# Patient Record
Sex: FEMALE | Race: WHITE | NOT HISPANIC OR LATINO | URBAN - METROPOLITAN AREA
[De-identification: names, ages, dates, MRNs, and addresses within clinical notes are randomized per-mention and may not be internally consistent; named-entity substitution may affect disease eponyms.]

---

## 2018-01-26 ENCOUNTER — INPATIENT (INPATIENT)
Facility: HOSPITAL | Age: 53
LOS: 1 days | Discharge: HOME | DRG: 193 | End: 2018-01-28
Attending: HOSPITALIST | Admitting: HOSPITALIST
Payer: COMMERCIAL

## 2018-01-26 VITALS
OXYGEN SATURATION: 96 % | SYSTOLIC BLOOD PRESSURE: 150 MMHG | RESPIRATION RATE: 20 BRPM | HEIGHT: 63 IN | TEMPERATURE: 98 F | DIASTOLIC BLOOD PRESSURE: 100 MMHG | HEART RATE: 101 BPM | WEIGHT: 240.08 LBS

## 2018-01-26 DIAGNOSIS — J11.00 INFLUENZA DUE TO UNIDENTIFIED INFLUENZA VIRUS WITH UNSPECIFIED TYPE OF PNEUMONIA: ICD-10-CM

## 2018-01-26 DIAGNOSIS — Z90.710 ACQUIRED ABSENCE OF BOTH CERVIX AND UTERUS: Chronic | ICD-10-CM

## 2018-01-26 LAB
ALBUMIN SERPL ELPH-MCNC: 4.1 G/DL — SIGNIFICANT CHANGE UP (ref 3.3–5.2)
ALP SERPL-CCNC: 73 U/L — SIGNIFICANT CHANGE UP (ref 40–120)
ALT FLD-CCNC: 15 U/L — SIGNIFICANT CHANGE UP
ANION GAP SERPL CALC-SCNC: 15 MMOL/L — SIGNIFICANT CHANGE UP (ref 5–17)
APPEARANCE UR: CLEAR — SIGNIFICANT CHANGE UP
AST SERPL-CCNC: 31 U/L — SIGNIFICANT CHANGE UP
BACTERIA # UR AUTO: ABNORMAL
BILIRUB SERPL-MCNC: 0.3 MG/DL — LOW (ref 0.4–2)
BILIRUB UR-MCNC: NEGATIVE — SIGNIFICANT CHANGE UP
BUN SERPL-MCNC: 9 MG/DL — SIGNIFICANT CHANGE UP (ref 8–20)
CALCIUM SERPL-MCNC: 9 MG/DL — SIGNIFICANT CHANGE UP (ref 8.6–10.2)
CHLORIDE SERPL-SCNC: 99 MMOL/L — SIGNIFICANT CHANGE UP (ref 98–107)
CO2 SERPL-SCNC: 25 MMOL/L — SIGNIFICANT CHANGE UP (ref 22–29)
COLOR SPEC: YELLOW — SIGNIFICANT CHANGE UP
CREAT SERPL-MCNC: 0.67 MG/DL — SIGNIFICANT CHANGE UP (ref 0.5–1.3)
DIFF PNL FLD: ABNORMAL
EOSINOPHIL # BLD AUTO: 0 K/UL — SIGNIFICANT CHANGE UP (ref 0–0.5)
EOSINOPHIL NFR BLD AUTO: 0.1 % — SIGNIFICANT CHANGE UP (ref 0–6)
EPI CELLS # UR: SIGNIFICANT CHANGE UP
FLUAV H1 2009 PAND RNA SPEC QL NAA+PROBE: DETECTED
GLUCOSE SERPL-MCNC: 105 MG/DL — SIGNIFICANT CHANGE UP (ref 70–115)
GLUCOSE UR QL: NEGATIVE MG/DL — SIGNIFICANT CHANGE UP
HCT VFR BLD CALC: 36.9 % — LOW (ref 37–47)
HGB BLD-MCNC: 12.3 G/DL — SIGNIFICANT CHANGE UP (ref 12–16)
KETONES UR-MCNC: NEGATIVE — SIGNIFICANT CHANGE UP
LACTATE BLDV-MCNC: 1.1 MMOL/L — SIGNIFICANT CHANGE UP (ref 0.5–2)
LEUKOCYTE ESTERASE UR-ACNC: NEGATIVE — SIGNIFICANT CHANGE UP
LYMPHOCYTES # BLD AUTO: 2.2 K/UL — SIGNIFICANT CHANGE UP (ref 1–4.8)
LYMPHOCYTES # BLD AUTO: 21.5 % — SIGNIFICANT CHANGE UP (ref 20–55)
MCHC RBC-ENTMCNC: 29.4 PG — SIGNIFICANT CHANGE UP (ref 27–31)
MCHC RBC-ENTMCNC: 33.3 G/DL — SIGNIFICANT CHANGE UP (ref 32–36)
MCV RBC AUTO: 88.3 FL — SIGNIFICANT CHANGE UP (ref 81–99)
MONOCYTES # BLD AUTO: 0.8 K/UL — SIGNIFICANT CHANGE UP (ref 0–0.8)
MONOCYTES NFR BLD AUTO: 7.8 % — SIGNIFICANT CHANGE UP (ref 3–10)
NEUTROPHILS # BLD AUTO: 7.1 K/UL — SIGNIFICANT CHANGE UP (ref 1.8–8)
NEUTROPHILS NFR BLD AUTO: 70.3 % — SIGNIFICANT CHANGE UP (ref 37–73)
NITRITE UR-MCNC: NEGATIVE — SIGNIFICANT CHANGE UP
PH UR: 6 — SIGNIFICANT CHANGE UP (ref 5–8)
PLATELET # BLD AUTO: 242 K/UL — SIGNIFICANT CHANGE UP (ref 150–400)
POTASSIUM SERPL-MCNC: 4 MMOL/L — SIGNIFICANT CHANGE UP (ref 3.5–5.3)
POTASSIUM SERPL-SCNC: 4 MMOL/L — SIGNIFICANT CHANGE UP (ref 3.5–5.3)
PROT SERPL-MCNC: 8 G/DL — SIGNIFICANT CHANGE UP (ref 6.6–8.7)
PROT UR-MCNC: NEGATIVE MG/DL — SIGNIFICANT CHANGE UP
RAPID RVP RESULT: DETECTED
RBC # BLD: 4.18 M/UL — LOW (ref 4.4–5.2)
RBC # FLD: 12.8 % — SIGNIFICANT CHANGE UP (ref 11–15.6)
RBC CASTS # UR COMP ASSIST: SIGNIFICANT CHANGE UP /HPF (ref 0–4)
SODIUM SERPL-SCNC: 139 MMOL/L — SIGNIFICANT CHANGE UP (ref 135–145)
SP GR SPEC: 1.01 — SIGNIFICANT CHANGE UP (ref 1.01–1.02)
UROBILINOGEN FLD QL: NEGATIVE MG/DL — SIGNIFICANT CHANGE UP
WBC # BLD: 10.1 K/UL — SIGNIFICANT CHANGE UP (ref 4.8–10.8)
WBC # FLD AUTO: 10.1 K/UL — SIGNIFICANT CHANGE UP (ref 4.8–10.8)
WBC UR QL: SIGNIFICANT CHANGE UP

## 2018-01-26 PROCEDURE — 99285 EMERGENCY DEPT VISIT HI MDM: CPT

## 2018-01-26 PROCEDURE — 71250 CT THORAX DX C-: CPT | Mod: 26

## 2018-01-26 PROCEDURE — 99223 1ST HOSP IP/OBS HIGH 75: CPT

## 2018-01-26 PROCEDURE — 71045 X-RAY EXAM CHEST 1 VIEW: CPT | Mod: 26

## 2018-01-26 RX ORDER — SACCHAROMYCES BOULARDII 250 MG
250 POWDER IN PACKET (EA) ORAL
Qty: 0 | Refills: 0 | Status: DISCONTINUED | OUTPATIENT
Start: 2018-01-26 | End: 2018-01-28

## 2018-01-26 RX ORDER — IPRATROPIUM/ALBUTEROL SULFATE 18-103MCG
3 AEROSOL WITH ADAPTER (GRAM) INHALATION EVERY 6 HOURS
Qty: 0 | Refills: 0 | Status: DISCONTINUED | OUTPATIENT
Start: 2018-01-26 | End: 2018-01-28

## 2018-01-26 RX ORDER — IPRATROPIUM/ALBUTEROL SULFATE 18-103MCG
3 AEROSOL WITH ADAPTER (GRAM) INHALATION ONCE
Qty: 0 | Refills: 0 | Status: COMPLETED | OUTPATIENT
Start: 2018-01-26 | End: 2018-01-26

## 2018-01-26 RX ORDER — SODIUM CHLORIDE 9 MG/ML
1000 INJECTION INTRAMUSCULAR; INTRAVENOUS; SUBCUTANEOUS ONCE
Qty: 0 | Refills: 0 | Status: COMPLETED | OUTPATIENT
Start: 2018-01-26 | End: 2018-01-26

## 2018-01-26 RX ORDER — CEFTRIAXONE 500 MG/1
1 INJECTION, POWDER, FOR SOLUTION INTRAMUSCULAR; INTRAVENOUS ONCE
Qty: 0 | Refills: 0 | Status: DISCONTINUED | OUTPATIENT
Start: 2018-01-26 | End: 2018-01-26

## 2018-01-26 RX ORDER — AZITHROMYCIN 500 MG/1
500 TABLET, FILM COATED ORAL ONCE
Qty: 0 | Refills: 0 | Status: COMPLETED | OUTPATIENT
Start: 2018-01-26 | End: 2018-01-26

## 2018-01-26 RX ORDER — PANTOPRAZOLE SODIUM 20 MG/1
40 TABLET, DELAYED RELEASE ORAL
Qty: 0 | Refills: 0 | Status: DISCONTINUED | OUTPATIENT
Start: 2018-01-26 | End: 2018-01-28

## 2018-01-26 RX ORDER — VANCOMYCIN HCL 1 G
1000 VIAL (EA) INTRAVENOUS ONCE
Qty: 0 | Refills: 0 | Status: COMPLETED | OUTPATIENT
Start: 2018-01-26 | End: 2018-01-26

## 2018-01-26 RX ADMIN — SODIUM CHLORIDE 2000 MILLILITER(S): 9 INJECTION INTRAMUSCULAR; INTRAVENOUS; SUBCUTANEOUS at 13:31

## 2018-01-26 RX ADMIN — Medication 3 MILLILITER(S): at 21:12

## 2018-01-26 RX ADMIN — Medication 250 MILLIGRAM(S): at 15:00

## 2018-01-26 RX ADMIN — Medication 75 MILLIGRAM(S): at 13:24

## 2018-01-26 RX ADMIN — Medication 3 MILLILITER(S): at 15:16

## 2018-01-26 RX ADMIN — AZITHROMYCIN 255 MILLIGRAM(S): 500 TABLET, FILM COATED ORAL at 13:24

## 2018-01-26 RX ADMIN — Medication 250 MILLIGRAM(S): at 18:28

## 2018-01-26 RX ADMIN — Medication 40 MILLIGRAM(S): at 13:24

## 2018-01-26 RX ADMIN — Medication 3 MILLILITER(S): at 13:25

## 2018-01-26 NOTE — ED ADULT NURSE NOTE - OBJECTIVE STATEMENT
pt alert and awake x3, arrived to ED from [facility] with flu like symptoms and diff breathing that started yesterday, pt was diagnosed with flu and was prescribed tamiflu, states she went back to [facility] and was sent for IV abx, LS wheezes and rales.

## 2018-01-26 NOTE — H&P ADULT - NSHPPHYSICALEXAM_GEN_ALL_CORE
PHYSICAL EXAM:    Vital Signs Last 24 Hrs  T(C): 36.7 (26 Jan 2018 16:33), Max: 36.9 (26 Jan 2018 10:26)  T(F): 98 (26 Jan 2018 16:33), Max: 98.5 (26 Jan 2018 10:26)  HR: 88 (26 Jan 2018 13:03) (88 - 101)  BP: 144/74 (26 Jan 2018 16:33) (144/74 - 172/80)  BP(mean): --  RR: 20 (26 Jan 2018 16:33) (20 - 20)  SpO2: 98% (26 Jan 2018 16:33) (96% - 98%)    GENERAL: Pt lying comfortably, NAD.  ENMT: PERRL, +EOMI.  NECK: soft, Supple, No JVD,   CHEST/LUNG: Minimal wheezing,   HEART: S1S2+, Regular rate and rhythm; No murmurs.  ABDOMEN: Soft, Nontender, Nondistended; Bowel sounds present.  MUSCULOSKELETAL: Normal range of motion.  SKIN: No rashes or lesions.  NEURO: AAOX3, no focal deficits, no motor r sensory loss.  PSYCH: normal mood. PHYSICAL EXAM:    Vital Signs Last 24 Hrs  T(C): 36.7 (26 Jan 2018 16:33), Max: 36.9 (26 Jan 2018 10:26)  T(F): 98 (26 Jan 2018 16:33), Max: 98.5 (26 Jan 2018 10:26)  HR: 88 (26 Jan 2018 13:03) (88 - 101)  BP: 144/74 (26 Jan 2018 16:33) (144/74 - 172/80)  BP(mean): --  RR: 20 (26 Jan 2018 16:33) (20 - 20)  SpO2: 98% (26 Jan 2018 16:33) (96% - 98%)    GENERAL: Pt lying comfortably, NAD.  ENMT: PERRL, +EOMI.  NECK: soft, Supple, No JVD,   CHEST/LUNG: CTAB, no wheezing,   HEART: S1S2+, Regular rate and rhythm; No murmurs.  ABDOMEN: Soft, Nontender, Nondistended; Bowel sounds present.  MUSCULOSKELETAL: Normal range of motion.  SKIN: No rashes or lesions.  NEURO: AAOX3, no focal deficits, no motor r sensory loss.  PSYCH: normal mood.

## 2018-01-26 NOTE — H&P ADULT - ASSESSMENT
51 yo female with no known PMHx, not on any medication at home, presented to ED presents with fever, SOB, cough, malaise for last few days. Seen at urgent care and found to have Flu A and was given Tamiflu few days ago. Since then symptoms progressed and went to urgent care again, xray reportedly showed b/l pneumonia at urgent care and she was sent to ED for eval. In Ed patient noted to have hypoxia and CT chest showed mild bronchiectasis.    Plan:    Acute hypoxic respiratory failure due to bronchiectasis:  - S/p Zithromax /vanco/ solumedrol/ Duoneb in ED. Pt still hypoxic.  - Will keep on , supplement oxygen, incentive spirometry, Duoneb standing for today, Robitussin prn and also start Levaquin.  - F/u blood cx, Sputum cx ordered.     DVT ppx:  - Lovenox 51 yo female with no known PMHx, not on any medication at home, presented to ED presents with fever, SOB, cough, malaise for last few days. Seen at urgent care and found to have Flu A and was given Tamiflu few days ago. Since then symptoms progressed and went to urgent care again, xray reportedly showed b/l pneumonia at urgent care and she was sent to ED for eval. In Ed patient noted to have hypoxia and CT chest showed mild bronchiectasis.    Plan:    Acute hypoxic respiratory failure due to bronchiectasis:  - S/p Zithromax /vanco/ solumedrol/ Duoneb in ED. Pt still hypoxic.  - Will keep on , supplement oxygen, incentive spirometry, Duoneb standing for today, Robitussin prn and also start Levaquin.  - F/u blood cx, Sputum cx ordered. Check RVP, UA.     DVT ppx:  - Lovenox 53 yo female with no known PMHx, not on any medication at home, presented to ED presents with fever, SOB, cough, malaise for last few days. Seen at urgent care on Wednesday and found to have Flu A and was given Tamiflu. Since then symptoms progressed and went to urgent care again earlier today, xray reportedly showed b/l pneumonia at urgent care and she was sent to ED for eval. In ED patient noted to have hypoxia and CT chest showed mild bronchiectasis.    Plan:    Acute hypoxic respiratory failure due to bronchiectasis:  - S/p Zithromax /vanco/ solumedrol/ Duoneb in ED. Pt still hypoxic. No wheezing on exam.  - Will keep on , supplement oxygen, incentive spirometry, Duoneb standing for today, Robitussin prn and also start Levaquin.  - F/u blood cx, Sputum cx ordered. Check RVP, UA.     influenza:  - On tamilfu at home D-3.  - RVP ordered.  - C/w Tamiflu to complete 5 day course.     DVT ppx:  - Lovenox

## 2018-01-26 NOTE — H&P ADULT - HISTORY OF PRESENT ILLNESS
51 yo female with no known PMHx, not on any medication at home, presented to ED presents with SOB for last few days. Patient states she was recently on a cruise to [facility]- returned, and developed low grade fever for several days associated with malaise and generalized achiness- she was seen at urgent care and found to have Flu A and was given Tamiflu. Since then symptoms progressed- developed SOB with wheezing and coughing and went to urgent care again, xray reportedly showed b/l pneumonia at urgent care and she was sent to ED for eval. Pt denied other complaints no vomiting, nausea, chest pain, bowel/bladder habits are normal.

## 2018-01-26 NOTE — H&P ADULT - NSHPLABSRESULTS_GEN_ALL_CORE
LABS:                        12.3   10.1  )-----------( 242      ( 2018 13:11 )             36.9         139  |  99  |  9.0  ----------------------------<  105  4.0   |  25.0  |  0.67    Ca    9.0      2018 13:11    TPro  8.0  /  Alb  4.1  /  TBili  0.3<L>  /  DBili  x   /  AST  31  /  ALT  15  /  AlkPhos  73          LIVER FUNCTIONS - ( 2018 13:11 )  Alb: 4.1 g/dL / Pro: 8.0 g/dL / ALK PHOS: 73 U/L / ALT: 15 U/L / AST: 31 U/L / GGT: x           Urinalysis Basic - ( 2018 16:49 )    Color: Yellow / Appearance: Clear / S.010 / pH: x  Gluc: x / Ketone: Negative  / Bili: Negative / Urobili: Negative mg/dL   Blood: x / Protein: Negative mg/dL / Nitrite: Negative   Leuk Esterase: Negative / RBC: x / WBC x   Sq Epi: x / Non Sq Epi: x / Bacteria: x

## 2018-01-26 NOTE — ED PROVIDER NOTE - OBJECTIVE STATEMENT
53 yo female presents with fever, cough, and SOB since wednesday; patient states she was recently on a cruise to [facility], returned, and developed fever several days later; achy; seen at urgent care, started on tamiflu; had +confirmed flu A; symptoms have progressed, SOB, now wheezing; no hx of asthma, no tobacco; seen at urgent care again, xray reportedly showed b/l pneumonia; sent to ED for eval

## 2018-01-26 NOTE — ED ADULT TRIAGE NOTE - CHIEF COMPLAINT QUOTE
had a cold went to [facility] today was told she has bilateral pneumonia, now c/o diff. breathing

## 2018-01-26 NOTE — ED PROVIDER NOTE - PROGRESS NOTE DETAILS
patient reassessed, still with dyspnea, worse with ambulating to bathroom, rhonchorous, wheezing, room air sat 90%; better on O2, will admit

## 2018-01-26 NOTE — ED ADULT NURSE NOTE - CHIEF COMPLAINT QUOTE
had a cold went to [facility] today was told she has bilateral pneumonia, now c/o diff. breathing

## 2018-01-27 DIAGNOSIS — J09.X1 INFLUENZA DUE TO IDENTIFIED NOVEL INFLUENZA A VIRUS WITH PNEUMONIA: ICD-10-CM

## 2018-01-27 DIAGNOSIS — Z29.9 ENCOUNTER FOR PROPHYLACTIC MEASURES, UNSPECIFIED: ICD-10-CM

## 2018-01-27 DIAGNOSIS — J47.0 BRONCHIECTASIS WITH ACUTE LOWER RESPIRATORY INFECTION: ICD-10-CM

## 2018-01-27 LAB
CULTURE RESULTS: NO GROWTH — SIGNIFICANT CHANGE UP
HCT VFR BLD CALC: 34.7 % — LOW (ref 37–47)
HGB BLD-MCNC: 11.3 G/DL — LOW (ref 12–16)
MCHC RBC-ENTMCNC: 29 PG — SIGNIFICANT CHANGE UP (ref 27–31)
MCHC RBC-ENTMCNC: 32.6 G/DL — SIGNIFICANT CHANGE UP (ref 32–36)
MCV RBC AUTO: 89 FL — SIGNIFICANT CHANGE UP (ref 81–99)
PLATELET # BLD AUTO: 283 K/UL — SIGNIFICANT CHANGE UP (ref 150–400)
RBC # BLD: 3.9 M/UL — LOW (ref 4.4–5.2)
RBC # FLD: 12.4 % — SIGNIFICANT CHANGE UP (ref 11–15.6)
SPECIMEN SOURCE: SIGNIFICANT CHANGE UP
WBC # BLD: 8.1 K/UL — SIGNIFICANT CHANGE UP (ref 4.8–10.8)
WBC # FLD AUTO: 8.1 K/UL — SIGNIFICANT CHANGE UP (ref 4.8–10.8)

## 2018-01-27 PROCEDURE — 99233 SBSQ HOSP IP/OBS HIGH 50: CPT

## 2018-01-27 RX ORDER — INFLUENZA VIRUS VACCINE 15; 15; 15; 15 UG/.5ML; UG/.5ML; UG/.5ML; UG/.5ML
0.5 SUSPENSION INTRAMUSCULAR ONCE
Qty: 0 | Refills: 0 | Status: COMPLETED | OUTPATIENT
Start: 2018-01-27 | End: 2018-01-27

## 2018-01-27 RX ADMIN — Medication 3 MILLILITER(S): at 09:15

## 2018-01-27 RX ADMIN — Medication 75 MILLIGRAM(S): at 06:47

## 2018-01-27 RX ADMIN — Medication 250 MILLIGRAM(S): at 06:47

## 2018-01-27 RX ADMIN — PANTOPRAZOLE SODIUM 40 MILLIGRAM(S): 20 TABLET, DELAYED RELEASE ORAL at 06:48

## 2018-01-27 RX ADMIN — Medication 75 MILLIGRAM(S): at 18:12

## 2018-01-27 RX ADMIN — Medication 3 MILLILITER(S): at 21:04

## 2018-01-27 RX ADMIN — Medication 250 MILLIGRAM(S): at 18:12

## 2018-01-27 RX ADMIN — Medication 3 MILLILITER(S): at 15:38

## 2018-01-27 NOTE — ED ADULT NURSE REASSESSMENT NOTE - NS ED NURSE REASSESS COMMENT FT1
Pt is Aox3 in NAD. Pt denies complaint.  IV clean dry and intact, running without difficulty. Pt OOB independently. Safety maintained, Bed locked in lowest position. Call bell in reach. Pt awaiting bed placement.
Pt is Aox3 in NAD. Pt denies complaint.  IV clean dry and intact. Pt OOB independently. Safety maintained, Bed locked in lowest position. Call bell in reach. Pt awaiting bed placement.
pt resting comfortably denies any pain no sob at rest

## 2018-01-27 NOTE — PROGRESS NOTE ADULT - SUBJECTIVE AND OBJECTIVE BOX
HOSPITALIST PROGRESS NOTE    TRISTA HAMLIN  [id number]  52yFemale    Patient is a 52y old  Female who presents with a chief complaint of Fever, cough, SOB (2018 16:48)      SUBJECTIVE:   Chart reviewed since admission, discussed with Dr Cordero  Patient seen and examined at bedside influenza, bronchiectasis.  Feels better though still with dyspnea and cough.  Denies fever, chills      OBJECTIVE:  Vital Signs Last 24 Hrs  T(C): 37.2 (2018 07:30), Max: 37.2 (2018 07:30)  T(F): 98.9 (2018 07:30), Max: 98.9 (2018 07:30)  HR: 83 (2018 07:30) (74 - 92)  BP: 128/79 (2018 07:30) (128/79 - 176/79)  BP(mean): --  RR: 20 (2018 07:30) (18 - 23)  SpO2: 96% (2018 08:00) (94% - 98%)    PHYSICAL EXAMINATION  General: NAD[+]   nontoxic[]   ill-appearing[]  Obese[+] Sitting on edge of bed  HEENT: AT/NC[+]  PERRLA[]  EOMI[+]   Moist oral mucosa[]  Pharyngeal exudates[]  NECK: Supple[+]  JVD[] Carotid bruit[]  CVS: RRR[+]  Irregular[]  S1+S2[+]   Murmur[]  RESP: Fair air entry bilaterally[+]   Clear sounds[]  bilateral rhonchi []  wheeze[]   Crackles[]  GI: Soft[+]  Nondistended[]   Nontender[+]   Bowel Sounds[+]  Mass[]   HSM[]   Ascites[]  : suprapubic tenderness[-]   CVA Tenderness[]   Padilla[]  MS: FROM[+]   Edema[-]  CNS: AAOx3[+]    Grossly intact  INTEG: Skin is Warm[+]  dry[] Lesion[] Decubitus[]  PSYCH: Fair Mood, Affect       I&O's Summary                          11.3   8.1   )-----------( 283      ( 2018 08:59 )             34.7       01-26    139  |  99  |  9.0  ----------------------------<  105  4.0   |  25.0  |  0.67    Ca    9.0      2018 13:11    TPro  8.0  /  Alb  4.1  /  TBili  0.3<L>  /  DBili  x   /  AST  31  /  ALT  15  /  AlkPhos  73          Urinalysis Basic - ( 2018 16:49 )    Color: Yellow / Appearance: Clear / S.010 / pH: x  Gluc: x / Ketone: Negative  / Bili: Negative / Urobili: Negative mg/dL   Blood: x / Protein: Negative mg/dL / Nitrite: Negative   Leuk Esterase: Negative / RBC: 0-2 /HPF / WBC 0-2   Sq Epi: x / Non Sq Epi: Occasional / Bacteria: Occasional        Culture:    TTE:    RADIOLOGY  < from: CT Chest No Cont (18 @ 14:44) >     EXAM:  CT CHEST                          PROCEDURE DATE:  2018          INTERPRETATION:  Chest CT without contrast .  COMPARISON: None.  CLINICAL INFORMATION: Abnormal breath sounds. Assess for pneumonia..  TECHNIQUE: Contiguous axial 2.5 mmslice thickness images of the chest   were obtained without intravenous contrast administration.  FINDINGS:  There is bibasilar mild bronchiectasis with bibasilar linear and   subsegmental atelectasis. No gross endobronchial obstructing lesions   identified. No pleural effusion or pneumothorax. Upper lobes clear.   There are no mediastinal lymphadenopathy or masses.    The mediastinum great vessels are normal.     The heart is normal. There is no pericardial effusion.    Visualized upper abdominal viscera unremarkable.    The bones are normal.     IMPRESSION:    Bilateral bibasilar mild bronchiectasis with them peripheral basilar   linear subsegmental atelectasis.                DARA PORTER M.D., ATTENDING RADIOLOGIST  This document has been electronically signed. 2018  3:26PM    < end of copied text >        MEDICATIONS  (STANDING):  ALBUTerol/ipratropium for Nebulization 3 milliLiter(s) Nebulizer every 6 hours  levoFLOXacin IVPB 500 milliGRAM(s) IV Intermittent every 24 hours  levoFLOXacin IVPB      oseltamivir 75 milliGRAM(s) Oral two times a day  pantoprazole    Tablet 40 milliGRAM(s) Oral before breakfast  saccharomyces boulardii 250 milliGRAM(s) Oral two times a day      MEDICATIONS  (PRN):  guaiFENesin    Syrup 100 milliGRAM(s) Oral every 6 hours PRN Cough

## 2018-01-27 NOTE — PATIENT PROFILE ADULT. - SPIRITUAL CULTURAL, RELIGIOUS PRACTICES/VALUES, PROFILE
Other (Free Text): Nonspecific facial rash that is pruritic and painful for the past several months. No other symptoms. Pt also c/o weight gain and fatigue. labs ordered for thyroid and diabetic screening. F/u in 1 month. If no improvement, consider rosacea tx. Note Text (......Xxx Chief Complaint.): This diagnosis correlates with the Detail Level: Zone none

## 2018-01-27 NOTE — PROGRESS NOTE ADULT - ASSESSMENT
52 year old female presenting with dyspnea and hypoxia. RVP+ Influenza A, CT Chest with bilateral bibasilar bronchiectasis as well as atelectasis.  Still with hypoxia requiring supplemental oxygen.

## 2018-01-28 ENCOUNTER — TRANSCRIPTION ENCOUNTER (OUTPATIENT)
Age: 53
End: 2018-01-28

## 2018-01-28 VITALS
RESPIRATION RATE: 18 BRPM | OXYGEN SATURATION: 96 % | DIASTOLIC BLOOD PRESSURE: 83 MMHG | SYSTOLIC BLOOD PRESSURE: 139 MMHG | HEART RATE: 77 BPM | TEMPERATURE: 98 F

## 2018-01-28 LAB
APPEARANCE UR: CLEAR — SIGNIFICANT CHANGE UP
BILIRUB UR-MCNC: NEGATIVE — SIGNIFICANT CHANGE UP
COLOR SPEC: YELLOW — SIGNIFICANT CHANGE UP
DIFF PNL FLD: NEGATIVE — SIGNIFICANT CHANGE UP
GLUCOSE UR QL: NEGATIVE MG/DL — SIGNIFICANT CHANGE UP
GRAM STN FLD: SIGNIFICANT CHANGE UP
KETONES UR-MCNC: NEGATIVE — SIGNIFICANT CHANGE UP
LEUKOCYTE ESTERASE UR-ACNC: NEGATIVE — SIGNIFICANT CHANGE UP
NITRITE UR-MCNC: NEGATIVE — SIGNIFICANT CHANGE UP
PH UR: 6 — SIGNIFICANT CHANGE UP (ref 5–8)
PROT UR-MCNC: NEGATIVE MG/DL — SIGNIFICANT CHANGE UP
SP GR SPEC: 1 — LOW (ref 1.01–1.02)
SPECIMEN SOURCE: SIGNIFICANT CHANGE UP
UROBILINOGEN FLD QL: NEGATIVE MG/DL — SIGNIFICANT CHANGE UP

## 2018-01-28 PROCEDURE — 96374 THER/PROPH/DIAG INJ IV PUSH: CPT

## 2018-01-28 PROCEDURE — 87070 CULTURE OTHR SPECIMN AEROBIC: CPT

## 2018-01-28 PROCEDURE — 87086 URINE CULTURE/COLONY COUNT: CPT

## 2018-01-28 PROCEDURE — 87486 CHLMYD PNEUM DNA AMP PROBE: CPT

## 2018-01-28 PROCEDURE — 87581 M.PNEUMON DNA AMP PROBE: CPT

## 2018-01-28 PROCEDURE — 71045 X-RAY EXAM CHEST 1 VIEW: CPT

## 2018-01-28 PROCEDURE — 85027 COMPLETE CBC AUTOMATED: CPT

## 2018-01-28 PROCEDURE — 99239 HOSP IP/OBS DSCHRG MGMT >30: CPT

## 2018-01-28 PROCEDURE — 96375 TX/PRO/DX INJ NEW DRUG ADDON: CPT

## 2018-01-28 PROCEDURE — 99285 EMERGENCY DEPT VISIT HI MDM: CPT | Mod: 25

## 2018-01-28 PROCEDURE — 81001 URINALYSIS AUTO W/SCOPE: CPT

## 2018-01-28 PROCEDURE — 81003 URINALYSIS AUTO W/O SCOPE: CPT

## 2018-01-28 PROCEDURE — 87798 DETECT AGENT NOS DNA AMP: CPT

## 2018-01-28 PROCEDURE — 87040 BLOOD CULTURE FOR BACTERIA: CPT

## 2018-01-28 PROCEDURE — 80053 COMPREHEN METABOLIC PANEL: CPT

## 2018-01-28 PROCEDURE — 71250 CT THORAX DX C-: CPT

## 2018-01-28 PROCEDURE — 36415 COLL VENOUS BLD VENIPUNCTURE: CPT

## 2018-01-28 PROCEDURE — 93005 ELECTROCARDIOGRAM TRACING: CPT

## 2018-01-28 PROCEDURE — 83605 ASSAY OF LACTIC ACID: CPT

## 2018-01-28 PROCEDURE — 87633 RESP VIRUS 12-25 TARGETS: CPT

## 2018-01-28 PROCEDURE — 94640 AIRWAY INHALATION TREATMENT: CPT

## 2018-01-28 RX ORDER — ALBUTEROL 90 UG/1
2 AEROSOL, METERED ORAL
Qty: 1 | Refills: 0 | OUTPATIENT
Start: 2018-01-28 | End: 2018-02-26

## 2018-01-28 RX ORDER — SACCHAROMYCES BOULARDII 250 MG
1 POWDER IN PACKET (EA) ORAL
Qty: 14 | Refills: 0 | OUTPATIENT
Start: 2018-01-28

## 2018-01-28 RX ADMIN — Medication 250 MILLIGRAM(S): at 05:25

## 2018-01-28 RX ADMIN — Medication 3 MILLILITER(S): at 08:41

## 2018-01-28 RX ADMIN — PANTOPRAZOLE SODIUM 40 MILLIGRAM(S): 20 TABLET, DELAYED RELEASE ORAL at 05:25

## 2018-01-28 RX ADMIN — Medication 75 MILLIGRAM(S): at 05:25

## 2018-01-28 RX ADMIN — Medication 3 MILLILITER(S): at 01:17

## 2018-01-28 NOTE — DISCHARGE NOTE ADULT - PATIENT PORTAL LINK FT
“You can access the [facility] Patient Portal, offered by [facility], by registering with the following website: [email or web address]”

## 2018-01-28 NOTE — DISCHARGE NOTE ADULT - CARE PLAN
Principal Discharge DX:	Influenza A with pneumonia  Goal:	Resolution.  Assessment and plan of treatment:	Medications as prescribed.  Follow up with PMD  Secondary Diagnosis:	Bronchiectasis with acute lower respiratory infection  Assessment and plan of treatment:	Medications as prescribed.  Follow up with PMD  Secondary Diagnosis:	Hypoxia  Assessment and plan of treatment:	Medications as prescribed.  Follow up with PMD

## 2018-01-28 NOTE — DISCHARGE NOTE ADULT - MEDICATION SUMMARY - MEDICATIONS TO TAKE
I will START or STAY ON the medications listed below when I get home from the hospital:    oseltamivir 75 mg oral capsule  -- 1 cap(s) by mouth 2 times a day    Complete 5 days total  -- Indication: For Influenza A with pneumonia    albuterol 90 mcg/inh inhalation aerosol  -- 2 puff(s) inhaled 4 times a day, As Needed -for shortness of breath and/or wheezing   -- For inhalation only.  It is very important that you take or use this exactly as directed.  Do not skip doses or discontinue unless directed by your doctor.  Obtain medical advice before taking any non-prescription drugs as some may affect the action of this medication.  Shake well before use.    -- Indication: For Breathlessness    guaiFENesin 100 mg/5 mL oral liquid  -- 5 milliliter(s) by mouth every 6 hours, As needed, Cough  -- Indication: For cough as needed    saccharomyces boulardii lyo 250 mg oral capsule  -- 1 cap(s) by mouth 2 times a day  -- Indication: For Probiotic    levoFLOXacin 500 mg oral tablet  -- 1 tab(s) by mouth once a day   -- Avoid prolonged or excessive exposure to direct and/or artificial sunlight while taking this medication.  Do not take dairy products, antacids, or iron preparations within one hour of this medication.  Finish all this medication unless otherwise directed by prescriber.  May cause drowsiness or dizziness.  Medication should be taken with plenty of water.    -- Indication: For antibiotic

## 2018-01-28 NOTE — DISCHARGE NOTE ADULT - HOSPITAL COURSE
52 year old female presenting with dyspnea and hypoxia. RVP+ Influenza A, CT Chest with bilateral bibasilar bronchiectasis as well as atelectasis. Patient was treated with supplemental oxygen, continued on Tamiflu as well as received bronchodilators and encouraged to utilize incentive spirometry. Her symptoms have almost resolved, except for some exertional dyspnea. She is no longer hypoxic on room air.    Discharge time - 35 minutes

## 2018-01-28 NOTE — DISCHARGE NOTE ADULT - NS AS ACTIVITY OBS
Stairs allowed/Bathing allowed/Sex allowed/Return to Work/School allowed/Showering allowed/Driving allowed/Walking-Indoors allowed/Walking-Outdoors allowed

## 2018-01-30 LAB
CULTURE RESULTS: SIGNIFICANT CHANGE UP
SPECIMEN SOURCE: SIGNIFICANT CHANGE UP

## 2018-01-31 LAB
CULTURE RESULTS: SIGNIFICANT CHANGE UP
CULTURE RESULTS: SIGNIFICANT CHANGE UP
SPECIMEN SOURCE: SIGNIFICANT CHANGE UP
SPECIMEN SOURCE: SIGNIFICANT CHANGE UP

## 2018-02-26 ENCOUNTER — OUTPATIENT (OUTPATIENT)
Dept: OUTPATIENT SERVICES | Facility: HOSPITAL | Age: 53
LOS: 1 days | End: 2018-02-26
Payer: COMMERCIAL

## 2018-02-26 ENCOUNTER — APPOINTMENT (OUTPATIENT)
Dept: RADIOLOGY | Facility: CLINIC | Age: 53
End: 2018-02-26
Payer: COMMERCIAL

## 2018-02-26 DIAGNOSIS — J18.9 PNEUMONIA, UNSPECIFIED ORGANISM: ICD-10-CM

## 2018-02-26 DIAGNOSIS — Z90.710 ACQUIRED ABSENCE OF BOTH CERVIX AND UTERUS: Chronic | ICD-10-CM

## 2018-02-26 PROCEDURE — 71046 X-RAY EXAM CHEST 2 VIEWS: CPT

## 2018-02-26 PROCEDURE — 71046 X-RAY EXAM CHEST 2 VIEWS: CPT | Mod: 26

## 2018-03-29 ENCOUNTER — APPOINTMENT (OUTPATIENT)
Dept: PULMONOLOGY | Facility: CLINIC | Age: 53
End: 2018-03-29
Payer: COMMERCIAL

## 2018-03-29 VITALS
WEIGHT: 247 LBS | SYSTOLIC BLOOD PRESSURE: 128 MMHG | HEART RATE: 100 BPM | OXYGEN SATURATION: 96 % | DIASTOLIC BLOOD PRESSURE: 78 MMHG | HEIGHT: 63 IN | BODY MASS INDEX: 43.77 KG/M2

## 2018-03-29 DIAGNOSIS — R06.83 SNORING: ICD-10-CM

## 2018-03-29 DIAGNOSIS — E66.01 MORBID (SEVERE) OBESITY DUE TO EXCESS CALORIES: ICD-10-CM

## 2018-03-29 DIAGNOSIS — Z82.5 FAMILY HISTORY OF ASTHMA AND OTHER CHRONIC LOWER RESPIRATORY DISEASES: ICD-10-CM

## 2018-03-29 DIAGNOSIS — R94.2 ABNORMAL RESULTS OF PULMONARY FUNCTION STUDIES: ICD-10-CM

## 2018-03-29 DIAGNOSIS — Z86.018 PERSONAL HISTORY OF OTHER BENIGN NEOPLASM: ICD-10-CM

## 2018-03-29 DIAGNOSIS — R06.02 SHORTNESS OF BREATH: ICD-10-CM

## 2018-03-29 DIAGNOSIS — Z82.49 FAMILY HISTORY OF ISCHEMIC HEART DISEASE AND OTHER DISEASES OF THE CIRCULATORY SYSTEM: ICD-10-CM

## 2018-03-29 DIAGNOSIS — J98.11 ATELECTASIS: ICD-10-CM

## 2018-03-29 DIAGNOSIS — Z83.3 FAMILY HISTORY OF DIABETES MELLITUS: ICD-10-CM

## 2018-03-29 DIAGNOSIS — Z78.9 OTHER SPECIFIED HEALTH STATUS: ICD-10-CM

## 2018-03-29 DIAGNOSIS — Z86.39 PERSONAL HISTORY OF OTHER ENDOCRINE, NUTRITIONAL AND METABOLIC DISEASE: ICD-10-CM

## 2018-03-29 DIAGNOSIS — R93.8 ABNORMAL FINDINGS ON DIAGNOSTIC IMAGING OF OTHER SPECIFIED BODY STRUCTURES: ICD-10-CM

## 2018-03-29 PROCEDURE — 94729 DIFFUSING CAPACITY: CPT

## 2018-03-29 PROCEDURE — 85018 HEMOGLOBIN: CPT | Mod: QW

## 2018-03-29 PROCEDURE — 99204 OFFICE O/P NEW MOD 45 MIN: CPT | Mod: 25

## 2018-03-29 PROCEDURE — 94727 GAS DIL/WSHOT DETER LNG VOL: CPT

## 2018-03-29 PROCEDURE — 94010 BREATHING CAPACITY TEST: CPT

## 2018-03-29 RX ORDER — SIMVASTATIN 20 MG/1
20 TABLET, FILM COATED ORAL
Refills: 0 | Status: ACTIVE | COMMUNITY

## 2018-04-08 ENCOUNTER — TRANSCRIPTION ENCOUNTER (OUTPATIENT)
Age: 53
End: 2018-04-08

## 2018-07-01 ENCOUNTER — FORM ENCOUNTER (OUTPATIENT)
Age: 53
End: 2018-07-01

## 2018-07-02 ENCOUNTER — APPOINTMENT (OUTPATIENT)
Dept: CT IMAGING | Facility: CLINIC | Age: 53
End: 2018-07-02
Payer: COMMERCIAL

## 2018-07-02 ENCOUNTER — OUTPATIENT (OUTPATIENT)
Dept: OUTPATIENT SERVICES | Facility: HOSPITAL | Age: 53
LOS: 1 days | End: 2018-07-02
Payer: COMMERCIAL

## 2018-07-02 DIAGNOSIS — J98.11 ATELECTASIS: ICD-10-CM

## 2018-07-02 DIAGNOSIS — Z90.710 ACQUIRED ABSENCE OF BOTH CERVIX AND UTERUS: Chronic | ICD-10-CM

## 2018-07-02 DIAGNOSIS — R93.8 ABNORMAL FINDINGS ON DIAGNOSTIC IMAGING OF OTHER SPECIFIED BODY STRUCTURES: ICD-10-CM

## 2018-07-02 PROCEDURE — 71250 CT THORAX DX C-: CPT | Mod: 26

## 2018-07-02 PROCEDURE — 71250 CT THORAX DX C-: CPT

## 2018-07-26 ENCOUNTER — APPOINTMENT (OUTPATIENT)
Dept: PULMONOLOGY | Facility: CLINIC | Age: 53
End: 2018-07-26

## 2018-09-19 ENCOUNTER — TRANSCRIPTION ENCOUNTER (OUTPATIENT)
Age: 53
End: 2018-09-19

## 2018-10-22 ENCOUNTER — APPOINTMENT (OUTPATIENT)
Dept: DERMATOLOGY | Facility: CLINIC | Age: 53
End: 2018-10-22
Payer: COMMERCIAL

## 2018-10-22 VITALS — WEIGHT: 242 LBS | HEIGHT: 63 IN | BODY MASS INDEX: 42.88 KG/M2

## 2018-10-22 DIAGNOSIS — L28.0 LICHEN SIMPLEX CHRONICUS: ICD-10-CM

## 2018-10-22 DIAGNOSIS — R21 RASH AND OTHER NONSPECIFIC SKIN ERUPTION: ICD-10-CM

## 2018-10-22 DIAGNOSIS — B35.4 TINEA CORPORIS: ICD-10-CM

## 2018-10-22 DIAGNOSIS — L30.0 NUMMULAR DERMATITIS: ICD-10-CM

## 2018-10-22 PROCEDURE — 11900 INJECT SKIN LESIONS </W 7: CPT

## 2018-10-22 PROCEDURE — 99243 OFF/OP CNSLTJ NEW/EST LOW 30: CPT | Mod: 25

## 2018-10-22 RX ORDER — TRIAMCINOLONE ACETONIDE 1 MG/G
0.1 OINTMENT TOPICAL TWICE DAILY
Qty: 1 | Refills: 1 | Status: ACTIVE | COMMUNITY
Start: 2018-10-22 | End: 1900-01-01

## 2018-10-22 RX ORDER — ALBUTEROL SULFATE 90 UG/1
108 (90 BASE) AEROSOL, METERED RESPIRATORY (INHALATION)
Refills: 0 | Status: DISCONTINUED | COMMUNITY
End: 2018-10-22

## 2018-11-06 ENCOUNTER — APPOINTMENT (OUTPATIENT)
Dept: DERMATOLOGY | Facility: CLINIC | Age: 53
End: 2018-11-06

## 2018-12-24 ENCOUNTER — TRANSCRIPTION ENCOUNTER (OUTPATIENT)
Age: 53
End: 2018-12-24

## 2019-03-23 NOTE — DISCHARGE NOTE ADULT - DO YOU HAVE DIFFICULTY CLIMBING STAIRS
I spoke with patient about her positive urine culture and informed her that the antibiotics that she is on, will help her infection.    ----- Message from Leah Mobley NP sent at 3/23/2019  9:06 AM CDT -----  The culture returned positive; however, the antibiotic given is an appropriate treatment for the patient's condition.  Please call and check on patient and give results.  This patient is on Bactrim.    
No

## 2019-03-28 ENCOUNTER — APPOINTMENT (OUTPATIENT)
Dept: ORTHOPEDIC SURGERY | Facility: CLINIC | Age: 54
End: 2019-03-28
Payer: COMMERCIAL

## 2019-03-28 VITALS
BODY MASS INDEX: 43.41 KG/M2 | SYSTOLIC BLOOD PRESSURE: 190 MMHG | HEART RATE: 107 BPM | DIASTOLIC BLOOD PRESSURE: 103 MMHG | HEIGHT: 63 IN | WEIGHT: 245 LBS

## 2019-03-28 DIAGNOSIS — M17.10 UNILATERAL PRIMARY OSTEOARTHRITIS, UNSPECIFIED KNEE: ICD-10-CM

## 2019-03-28 DIAGNOSIS — Z86.39 PERSONAL HISTORY OF OTHER ENDOCRINE, NUTRITIONAL AND METABOLIC DISEASE: ICD-10-CM

## 2019-03-28 DIAGNOSIS — Z78.9 OTHER SPECIFIED HEALTH STATUS: ICD-10-CM

## 2019-03-28 PROCEDURE — 99203 OFFICE O/P NEW LOW 30 MIN: CPT | Mod: 25

## 2019-03-28 PROCEDURE — 20610 DRAIN/INJ JOINT/BURSA W/O US: CPT | Mod: LT

## 2019-03-28 PROCEDURE — 73562 X-RAY EXAM OF KNEE 3: CPT | Mod: LT

## 2019-03-28 RX ORDER — DICLOFENAC SODIUM 75 MG/1
75 TABLET, DELAYED RELEASE ORAL
Qty: 1 | Refills: 0 | Status: ACTIVE | COMMUNITY
Start: 2019-03-28 | End: 1900-01-01

## 2019-03-28 RX ORDER — BUTENAFINE HYDROCHLORIDE 10 MG/G
1 CREAM TOPICAL
Refills: 0 | Status: ACTIVE | COMMUNITY

## 2019-03-28 RX ORDER — HYDROCHLOROTHIAZIDE 25 MG/1
25 TABLET ORAL
Refills: 0 | Status: ACTIVE | COMMUNITY

## 2019-03-28 NOTE — DISCUSSION/SUMMARY
[de-identified] : Discussion had with patient \par Patient will follow up with Nett\par Patient aware must follow up with MD for further eval and treatment \par Patient will start Physical Therapy \par Patients questions were answered and patient is satisfied with today's visit\par

## 2019-03-28 NOTE — PROCEDURE
[de-identified] : Discussed at length with the patient the planned steroid and lidocaine injection. The risks, benefits, convalescence and alternatives were reviewed. The possible side effects discussed included but were not limited to: pain, swelling, heat and redness. There symptoms are generally mild but if they are extensive then contact the office. Giving pain relievers by mouth such as NSAID’s or Tylenol can generally treat the reactions to  steroid and lidocaine. Rare cases of infection have been noted. Rash, hives and itching may occur post injection. If you have muscle pain or cramps, flushing and or swelling of the face, rapid heart beat, nausea, dizziness, fever, chills, headache, difficulty breathing, swelling in the arms or legs, or have a prickly feeling of your skin, contact a health care provider immediately.\par \par Following this discussion, the left knee was prepped with betadine and under sterile conditions 9 cc of 1% lidocaine and 40 mg of depromedrol was injected with a 18 guage needle. The needle was introduced into the joint, aspiration was performed to ensure intra-articular placement and the medication was injected. Upon withdrawal of the needle the site was cleaned with alcohol and a bandaid applied. The patient tolerated the injection well and there were no adverse effects. Post injection instructions included no strenuous activity for 24 hours, cryotherapy and if there are any adverse effects to contact the office.\par

## 2019-03-28 NOTE — PHYSICAL EXAM
[UE/LE] : Sensory: Intact in bilateral upper & lower extremities [ALL] : dorsalis pedis, posterior tibial, femoral, popliteal, and radial 2+ and symmetric bilaterally [Normal] : Oriented to person, place, and time, insight and judgement were intact and the affect was normal [Poor Appearance] : well-appearing [Acute Distress] : not in acute distress [de-identified] : \par FROM to hip\par \par Skin Warm, Dry, no erythema, no lesions \par \par Knee \par stable\par anatomic alignment\par ROM 0-130\par Valgus/Varus Stress intact \par Effusion - Negative\par Crepitus - Negative \par Patella Grind - Negative \par Patella Apprehension - Negative \par Medial joint line tenderness - Negative\par Lateral Joint line tenderness - Negative\par Ebonie Test - Negative  \par Lachman test - Negative \par Anterior Drawer Test -  Negative \par \par Distal Motor Function intact \par Sensation intact to light touch bilaterally \par Pulses 2+ bilaterally\par  [de-identified] : 3 view left knee xray reveals narrowing of medial knee

## 2019-03-28 NOTE — HISTORY OF PRESENT ILLNESS
[Pain Location] : pain [de-identified] : Patient is a 53 year old female who presents c/o left knee pain since December.  Patient states she was in crawl space when pain started.  patient locates pain to medial knee.  positive clicking and swelling, no locking or buckling.  patient has not had any treatment for pain

## 2019-03-29 PROBLEM — M17.10 PRIMARY LOCALIZED OSTEOARTHROSIS OF THE KNEE: Status: ACTIVE | Noted: 2019-03-28

## 2019-06-07 ENCOUNTER — TRANSCRIPTION ENCOUNTER (OUTPATIENT)
Age: 54
End: 2019-06-07

## 2019-07-08 NOTE — PATIENT PROFILE ADULT. - PAIN CHRONIC, PROFILE
The patient is at high risk for a choroidal neovascular membrane. NO CNV SEEN TODAY. Dry ARMD is responsible for some decrease in vision. no

## 2019-07-26 ENCOUNTER — APPOINTMENT (OUTPATIENT)
Dept: ORTHOPEDIC SURGERY | Facility: CLINIC | Age: 54
End: 2019-07-26

## 2019-08-07 ENCOUNTER — TRANSCRIPTION ENCOUNTER (OUTPATIENT)
Age: 54
End: 2019-08-07

## 2022-04-02 ENCOUNTER — TRANSCRIPTION ENCOUNTER (OUTPATIENT)
Age: 57
End: 2022-04-02

## 2022-06-04 NOTE — PATIENT PROFILE ADULT. - NS PRO CONTRA FLU CONTRAIND
Xa not collected in [facility]. Lab called for 0530 collection of Xa.    Lab cannot run Xa 2 hours post collection.  Recollecting Xa now.    none

## 2022-07-29 ENCOUNTER — NON-APPOINTMENT (OUTPATIENT)
Age: 57
End: 2022-07-29

## 2023-08-08 ENCOUNTER — NON-APPOINTMENT (OUTPATIENT)
Age: 58
End: 2023-08-08

## 2025-06-19 NOTE — PATIENT PROFILE ADULT. - PRO ANTICIPATED DISCH DISP
home Post-Care Instructions: I reviewed with the patient in detail post-care instructions. Patient is to wear sunprotection, and avoid picking at any of the treated lesions. Pt may apply Vaseline to crusted or scabbing areas. Duration Of Freeze Thaw-Cycle (Seconds): 0 Consent: The patient's consent was obtained including but not limited to risks of crusting, scabbing, blistering, scarring, darker or lighter pigmentary change, recurrence, incomplete removal and infection. Render Note In Bullet Format When Appropriate: No Show Aperture Variable?: Yes Detail Level: Simple Medical Necessity Information: It is in your best interest to select a reason for this procedure from the list below. All of these items fulfill various CMS LCD requirements except the new and changing color options. Medical Necessity Clause: This procedure was medically necessary because the lesions that were treated were: Detail Level: Detailed Spray Paint Text: The liquid nitrogen was applied to the skin utilizing a spray paint frosting technique.